# Patient Record
Sex: MALE | Race: WHITE
[De-identification: names, ages, dates, MRNs, and addresses within clinical notes are randomized per-mention and may not be internally consistent; named-entity substitution may affect disease eponyms.]

---

## 2018-02-06 ENCOUNTER — HOSPITAL ENCOUNTER (OUTPATIENT)
Dept: HOSPITAL 47 - SLEEP | Age: 83
Discharge: HOME | End: 2018-02-06
Payer: MEDICARE

## 2018-02-06 DIAGNOSIS — Z79.01: ICD-10-CM

## 2018-02-06 DIAGNOSIS — Z79.899: ICD-10-CM

## 2018-02-06 DIAGNOSIS — E66.9: ICD-10-CM

## 2018-02-06 DIAGNOSIS — Z99.89: ICD-10-CM

## 2018-02-06 DIAGNOSIS — G47.33: Primary | ICD-10-CM

## 2018-02-06 DIAGNOSIS — I48.2: ICD-10-CM

## 2018-02-06 DIAGNOSIS — M19.90: ICD-10-CM

## 2018-02-06 DIAGNOSIS — E11.9: ICD-10-CM

## 2018-02-06 DIAGNOSIS — I10: ICD-10-CM

## 2018-02-06 DIAGNOSIS — F03.90: ICD-10-CM

## 2018-02-06 DIAGNOSIS — Z79.84: ICD-10-CM

## 2018-02-06 DIAGNOSIS — Z87.891: ICD-10-CM

## 2018-02-06 DIAGNOSIS — E78.5: ICD-10-CM

## 2018-02-06 DIAGNOSIS — N40.0: ICD-10-CM

## 2018-02-06 PROCEDURE — 99211 OFF/OP EST MAY X REQ PHY/QHP: CPT

## 2018-02-06 NOTE — CONS
CONSULTATION



REASON FOR CONSULTATION:

Sleep apnea.  Daytime drowsiness.



HISTORY OF PRESENT ILLNESS:

89-year-old male patient, known history of obstructive sleep apnea.  Diagnosis

established back in 2011 under the care of Dr. Conway.  The patient was 
subsequently

given a BiPAP at a pressure of 13/9 cm of water.  He has been utilizing the 
treatment

for the past 7 years.  His CPAP machine currently is not working.  The patient 
is

coming in and his main request is to get himself a new machine so he can 
continue with

the treatment.  For now, he is snoring and he is having difficulty with 
breathing

especially at night time and getting apneas throughout the night.  He is waking 
up

tired and fatigued during the day.  He goes to bed between 830-10 p.m. and 
wakes up at

8:00 am in the morning.  He is waking up non refreshed.  He is feeling sleepy

throughout the day.  He is using a Comfort Gel nose mask.  I checked his 
compliance

rate on the CPAP machine.  He is having considerable amount of leaks around the 
mask

and his AHI while on treatment is down to 8.  No recent weight gain or weight 
loss.



PAST MEDICAL HISTORY:

Obesity, obstructive sleep apnea, chronic atrial fibrillation.  Dementia, 
diabetes

mellitus type 2, hyperlipidemia, hypertension, prostate enlargement and 
osteoarthritis.



SURGICAL HISTORY:

Includes back surgery, left knee replacement, cardioversion, bilateral inguinal 
hernia

repair.  Laminectomy and bilateral cataract surgery.



DRUG ALLERGIES:

Not known.



OUTPATIENT MEDICATION LIST:

Includes:

1. Warfarin 10 mg half a tablet a day.

2. Lipitor 20 mg p.o. daily.

3. Aricept 10 mg p.o. daily.

4. Terazosin 10 mg twice a day.

5. Lasix 20 mg p.o. daily.

6. Citalopram 20 mg p.o. daily.

7. Synthroid 150 mcg p.o. daily.

8. Lisinopril 5 mg p.o. daily.

9. Metformin 1 g twice a day.

10.Zantac as needed.

11.Vitamin D3 1000 units daily.

12.Omega Q Plus 1 tab twice a day.



SOCIAL HISTORY:

The patient is a nonsmoker for now.  Smoked 45 pack years and quit smoking in 
1988.

Lives independently.  No history of alcohol and no history of IV drugs.



ALLERGIES:

Drug allergies are not known.



FAMILY HISTORY:

Negative for any for sleep apnea.  Negative for narcolepsy.



REVIEW OF SYSTEMS:

A 12-point review of system was done.  Positive findings are mentioned above in 
the

history of present illness.  Note that the patient has an underlying dementia 
and his

history providing skills are somewhat suboptimal at this point.  Neurological:  
He is

forgetful and he is having some memory problems with short-term memory.  Head 
and neck:

No neck pain.  He has visual impairments, hearing impairment and loud snoring.

PULMONARY:  Chronic exertional dyspnea.  Occasional cough without any 
significant

sputum production.  Pulmonary and cardiovascular:  No angina.  No palpitations.
  He has

chronic atrial fibrillation.

GI:  Negative for nausea, vomiting or abdominal pain.  No GI bleed.  :  
Symptoms for

prostatism.  No dysuria, frequency or urgency.  Musculoskeletal:  Chronic back 
pain.

No falls.  No injuries.  Skin negative for wounds or cellulitis.  Psychiatric 
negative

for anxiety or depression at this point in time.  His current vitals:  Blood 
pressure

is 151/58, pulse 53, respirations 16, temperature 98.2, saturation 97% on room 
air.

Weight is 282.  Height is 5 feet 7 inches.  Neck size 20 inches.  [place] score 
3, BMI

is 44.1.  General appearance:  Obese, calm and comfortable.  Head was atraumatic
,

normocephalic.  Neck short supple neck.  Crowded posterior pharynx.  Mallampati 
class

IV.

LUNGS:  Diminished breath sounds in the lung base bilaterally.

HEART:  Sounds are irregular.  Positive S1, S2.

ABDOMEN:  Soft, nontender.  No organomegaly.  Organs cannot be adequately 
palpated.

Extremities trace edema.  There is no cyanosis or clubbing.

NEUROLOGIC:  Alert and oriented x 2.  Poor short-term memory.  No focal 
neurological

deficits.

PSYCHIATRIC:  Negative for anxiety or depression.

SKIN:  Negative for any open wounds or cellulitis.



IMPRESSION:

1. Severe obstructive sleep apnea treated with a BiPAP pressure of 13/9 cm of 
water.

    The patient does not have  adequately functioning BiPAP and he wants a new 
machine

    ordered for him.  Based on the compliance data the patient is very 
compliant to his

    machine however he is leaking extensively around the mask and his AHI while 
on

    treatment is down to 8.  Ideally a another BiPAP titration will be 
indicated.

    However the patient based on his age and comorbidities opted not to.  For 
that

    reason, I ordered a BiPAP machine with the same setting.  I would like to 
give him

    a  VPAP unit should we decide to go with an automatic mode at a later 
stage.  We

    will set the pressure of 15/9 and he will be fitted with appropriate mask

    medications to prevent all these ongoing leaks.

2. Encourage weight loss.

3. Tight control of cardiovascular risk factors.

4. We will continue to follow and make further recommendations based on his 
overall

    clinical progress.  The patient will see me back in 30-90 days after he 
obtains his

    new machine for a compliancy followup and recheck.





GRACE / SULEIMAN: [id number] / Job#: [id number]

[email or web address]

## 2018-03-08 ENCOUNTER — HOSPITAL ENCOUNTER (OUTPATIENT)
Dept: HOSPITAL 47 - LABWHC1 | Age: 83
Discharge: HOME | End: 2018-03-08
Attending: DENTIST
Payer: MEDICARE

## 2018-03-08 DIAGNOSIS — D68.9: Primary | ICD-10-CM

## 2018-03-08 LAB
INR PPP: 1.1 (ref ?–1.2)
PT BLD: 10.8 SEC (ref 9–12)

## 2018-03-08 PROCEDURE — 85610 PROTHROMBIN TIME: CPT

## 2018-03-08 PROCEDURE — 36415 COLL VENOUS BLD VENIPUNCTURE: CPT

## 2018-03-14 ENCOUNTER — HOSPITAL ENCOUNTER (OUTPATIENT)
Dept: HOSPITAL 47 - LABWHC1 | Age: 83
Discharge: HOME | End: 2018-03-14
Payer: MEDICARE

## 2018-03-14 DIAGNOSIS — Z51.81: Primary | ICD-10-CM

## 2018-03-14 DIAGNOSIS — Z79.01: ICD-10-CM

## 2018-03-14 LAB
INR PPP: 1.1 (ref ?–1.2)
PT BLD: 10.7 SEC (ref 9–12)

## 2018-03-14 PROCEDURE — 85610 PROTHROMBIN TIME: CPT

## 2018-03-14 PROCEDURE — 36415 COLL VENOUS BLD VENIPUNCTURE: CPT
